# Patient Record
Sex: FEMALE | Race: WHITE | NOT HISPANIC OR LATINO | URBAN - METROPOLITAN AREA
[De-identification: names, ages, dates, MRNs, and addresses within clinical notes are randomized per-mention and may not be internally consistent; named-entity substitution may affect disease eponyms.]

---

## 2020-08-20 ENCOUNTER — OFFICE VISIT (OUTPATIENT)
Dept: URBAN - METROPOLITAN AREA CLINIC 40 | Facility: CLINIC | Age: 25
End: 2020-08-20
Payer: COMMERCIAL

## 2020-08-20 ENCOUNTER — LAB OUTSIDE AN ENCOUNTER (OUTPATIENT)
Dept: URBAN - METROPOLITAN AREA CLINIC 40 | Facility: CLINIC | Age: 25
End: 2020-08-20

## 2020-08-20 ENCOUNTER — WEB ENCOUNTER (OUTPATIENT)
Dept: URBAN - METROPOLITAN AREA CLINIC 40 | Facility: CLINIC | Age: 25
End: 2020-08-20

## 2020-08-20 DIAGNOSIS — R10.84 ABDOMINAL PAIN, GENERALIZED: ICD-10-CM

## 2020-08-20 DIAGNOSIS — R10.13 DYSPEPSIA: ICD-10-CM

## 2020-08-20 DIAGNOSIS — R11.0 NAUSEA: ICD-10-CM

## 2020-08-20 DIAGNOSIS — R19.7 DIARRHEA, UNSPECIFIED TYPE: ICD-10-CM

## 2020-08-20 PROCEDURE — 1036F TOBACCO NON-USER: CPT | Performed by: INTERNAL MEDICINE

## 2020-08-20 PROCEDURE — 99204 OFFICE O/P NEW MOD 45 MIN: CPT | Performed by: INTERNAL MEDICINE

## 2020-08-20 PROCEDURE — G8427 DOCREV CUR MEDS BY ELIG CLIN: HCPCS | Performed by: INTERNAL MEDICINE

## 2020-08-20 PROCEDURE — G8421 BMI NOT CALCULATED: HCPCS | Performed by: INTERNAL MEDICINE

## 2020-08-20 RX ORDER — ONDANSETRON HYDROCHLORIDE 4 MG/1
1 TABLET TABLET, FILM COATED ORAL
Qty: 90 | Refills: 3 | OUTPATIENT
Start: 2020-08-20

## 2020-08-20 RX ORDER — DICYCLOMINE HYDROCHLORIDE 20 MG/1
1 TABLET TABLET ORAL THREE TIMES A DAY
Qty: 90 | OUTPATIENT
Start: 2020-08-20 | End: 2020-09-19

## 2020-08-20 RX ORDER — PANTOPRAZOLE SODIUM 40 MG/1
1 TABLET TABLET, DELAYED RELEASE ORAL ONCE A DAY
Qty: 90 TABLET | Refills: 3 | OUTPATIENT
Start: 2020-08-20

## 2020-08-20 NOTE — HPI-TODAY'S VISIT:
Patient presents for GI evaluation of a 6-month or greater history of daily nausea and diarrhea.  Onset was around February but no specific change in medications her life at that time.  Certainly coronavirus has had a stressful impact but this predated the change in her work environment.  She describes up to 3 or more loose watery or unformed bowel movements almost every day for the last 6 months.  It does get better on some days but generally it is there on a daily basis.  No rectal bleeding during this time.  She also has diffuse moderate to severe abdominal cramps and pain.  Dull achy menstruation-like cramps but they occur also up to the epigastric region.  No alleviating or aggravating factors.  She is also been very bloated.  There is been daily nausea as well but no overt vomiting.  She recently tried probiotics and a lactose-free diet and this helped a bit but symptoms persist.  She has not lost overt weight or had other red flag symptoms.  Rare NSAID use with Aleve for headaches but not on a daily basis.  She recently had negative coronavirus testing.  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

## 2020-08-26 LAB
A/G RATIO: 1.7
ALBUMIN: 4.6
ALKALINE PHOSPHATASE: 51
ALT (SGPT): 15
AST (SGOT): 13
BASO (ABSOLUTE): 0.1
BASOS: 1
BILIRUBIN, TOTAL: 0.4
BUN/CREATININE RATIO: 11
BUN: 8
CALCIUM: 9.2
CARBON DIOXIDE, TOTAL: 22
CHLORIDE: 103
CREATININE: 0.72
EGFR IF AFRICN AM: 136
EGFR IF NONAFRICN AM: 118
EOS (ABSOLUTE): 0.1
EOS: 1
GLOBULIN, TOTAL: 2.7
GLUCOSE: 87
HEMATOCRIT: 43.5
HEMATOLOGY COMMENTS:: (no result)
HEMOGLOBIN: 13.7
IMMATURE CELLS: (no result)
IMMATURE GRANS (ABS): 0
IMMATURE GRANULOCYTES: 0
IMMUNOGLOBULIN A, QN, SERUM: 273
LIPASE: 26
LYMPHS (ABSOLUTE): 2.1
LYMPHS: 37
Lab: 221.9
Lab: 33.2
MCH: 28.2
MCHC: 31.5
MCV: 90
MONOCYTES(ABSOLUTE): 0.4
MONOCYTES: 7
NEUTROPHILS (ABSOLUTE): 2.9
NEUTROPHILS: 54
NRBC: (no result)
PLATELETS: 237
POTASSIUM: 4.3
PROTEIN, TOTAL: 7.3
RBC: 4.85
RDW: 11.8
SODIUM: 142
T-TRANSGLUTAMINASE (TTG) IGA: <2
T-TRANSGLUTAMINASE (TTG) IGG: 3
TSH: 2.64
WBC: 5.5

## 2020-09-02 ENCOUNTER — OFFICE VISIT (OUTPATIENT)
Dept: URBAN - METROPOLITAN AREA CLINIC 74 | Facility: CLINIC | Age: 25
End: 2020-09-02

## 2020-09-14 ENCOUNTER — TELEPHONE ENCOUNTER (OUTPATIENT)
Dept: URBAN - METROPOLITAN AREA CLINIC 40 | Facility: CLINIC | Age: 25
End: 2020-09-14

## 2020-09-14 RX ORDER — DICYCLOMINE HYDROCHLORIDE 20 MG/1
1 TABLET TABLET ORAL THREE TIMES A DAY
Qty: 90 TABLET
Start: 2020-08-20 | End: 2020-09-19

## 2020-09-23 ENCOUNTER — DASHBOARD ENCOUNTERS (OUTPATIENT)
Age: 25
End: 2020-09-23

## 2020-09-23 ENCOUNTER — OFFICE VISIT (OUTPATIENT)
Dept: URBAN - METROPOLITAN AREA CLINIC 74 | Facility: CLINIC | Age: 25
End: 2020-09-23
Payer: COMMERCIAL

## 2020-09-23 DIAGNOSIS — R10.13 DYSPEPSIA: ICD-10-CM

## 2020-09-23 DIAGNOSIS — R10.84 ABDOMINAL PAIN, GENERALIZED: ICD-10-CM

## 2020-09-23 DIAGNOSIS — R11.0 NAUSEA: ICD-10-CM

## 2020-09-23 DIAGNOSIS — K58.0 IRRITABLE BOWEL SYNDROME WITH DIARRHEA: ICD-10-CM

## 2020-09-23 DIAGNOSIS — R19.7 DIARRHEA, UNSPECIFIED TYPE: ICD-10-CM

## 2020-09-23 PROBLEM — 197125005: Status: ACTIVE | Noted: 2020-09-23

## 2020-09-23 PROCEDURE — G8419 CALC BMI OUT NRM PARAM NOF/U: HCPCS | Performed by: INTERNAL MEDICINE

## 2020-09-23 PROCEDURE — 1036F TOBACCO NON-USER: CPT | Performed by: INTERNAL MEDICINE

## 2020-09-23 PROCEDURE — 99214 OFFICE O/P EST MOD 30 MIN: CPT | Performed by: INTERNAL MEDICINE

## 2020-09-23 PROCEDURE — G8427 DOCREV CUR MEDS BY ELIG CLIN: HCPCS | Performed by: INTERNAL MEDICINE

## 2020-09-23 RX ORDER — PANTOPRAZOLE SODIUM 40 MG/1
1 TABLET TABLET, DELAYED RELEASE ORAL ONCE A DAY
Qty: 90 TABLET | Refills: 3 | Status: ACTIVE | COMMUNITY
Start: 2020-08-20

## 2020-09-23 RX ORDER — RIFAXIMIN 200 MG/1
1 TABLET TABLET ORAL THREE TIMES A DAY
Qty: 42 TABLET | Refills: 2 | OUTPATIENT
Start: 2020-09-23 | End: 2020-11-03

## 2020-09-23 RX ORDER — ONDANSETRON HYDROCHLORIDE 4 MG/1
1 TABLET TABLET, FILM COATED ORAL
Qty: 90 | Refills: 3 | Status: ACTIVE | COMMUNITY
Start: 2020-08-20

## 2020-09-23 RX ORDER — DICYCLOMINE HYDROCHLORIDE 20 MG/1
1 TABLET TABLET ORAL THREE TIMES A DAY
Status: ACTIVE | COMMUNITY

## 2020-09-23 NOTE — HPI-TODAY'S VISIT:
Patient presents for GI evaluation of a 6-month or greater history of daily nausea and diarrhea.  Onset was around February but no specific change in medications her life at that time.  Certainly coronavirus has had a stressful impact but this predated the change in her work environment.  She describes up to 3 or more loose watery or unformed bowel movements almost every day for the last 6 months.  It does get better on some days but generally it is there on a daily basis.  No rectal bleeding during this time.  She also has diffuse moderate to severe abdominal cramps and pain.  Dull achy menstruation-like cramps but they occur also up to the epigastric region.  No alleviating or aggravating factors.  She is also been very bloated.  There is been daily nausea as well but no overt vomiting.  She recently tried probiotics and a lactose-free diet and this helped a bit but symptoms persist.  She has not lost overt weight or had other red flag symptoms.  Rare NSAID use with Aleve for headaches but not on a daily basis.  She recently had negative coronavirus testing.  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  She is here for f/u after testing. Recent cbc/cmp/lipase/ttg/tsh and CT were all normal. She has been taking Dicyclomine.

## 2020-10-20 ENCOUNTER — TELEPHONE ENCOUNTER (OUTPATIENT)
Dept: URBAN - METROPOLITAN AREA CLINIC 92 | Facility: CLINIC | Age: 25
End: 2020-10-20

## 2020-10-20 RX ORDER — DICYCLOMINE HYDROCHLORIDE 20 MG/1
1 TABLET TABLET ORAL THREE TIMES A DAY
Qty: 270 TABLET | Refills: 1